# Patient Record
Sex: MALE | Race: WHITE | Employment: UNEMPLOYED | URBAN - METROPOLITAN AREA
[De-identification: names, ages, dates, MRNs, and addresses within clinical notes are randomized per-mention and may not be internally consistent; named-entity substitution may affect disease eponyms.]

---

## 2017-11-06 ENCOUNTER — HOSPITAL ENCOUNTER (OUTPATIENT)
Age: 22
Setting detail: SPECIMEN
Discharge: HOME | End: 2017-11-06
Payer: COMMERCIAL

## 2017-11-07 LAB — C. TRACHOMATIS DNA ,URINE: NEGATIVE

## 2018-10-21 ENCOUNTER — HOSPITAL ENCOUNTER (OUTPATIENT)
Age: 23
Setting detail: OBSERVATION
Discharge: HOME HEALTH | End: 2018-10-22
Attending: EMERGENCY MEDICINE | Admitting: PSYCHIATRY & NEUROLOGY
Payer: COMMERCIAL

## 2018-10-21 DIAGNOSIS — F32.A DEPRESSION WITH SUICIDAL IDEATION: Primary | ICD-10-CM

## 2018-10-21 DIAGNOSIS — R45.851 DEPRESSION WITH SUICIDAL IDEATION: Primary | ICD-10-CM

## 2018-10-21 DIAGNOSIS — R79.89 ELEVATED LIVER FUNCTION TESTS: ICD-10-CM

## 2018-10-21 DIAGNOSIS — F10.929 ACUTE ALCOHOLIC INTOXICATION WITH COMPLICATION (HCC): ICD-10-CM

## 2018-10-21 LAB
ABSOLUTE EOS #: 0 K/UL (ref 0–0.4)
ABSOLUTE IMMATURE GRANULOCYTE: ABNORMAL K/UL (ref 0–0.3)
ABSOLUTE LYMPH #: 2.6 K/UL (ref 1–4.8)
ABSOLUTE MONO #: 0.4 K/UL (ref 0.1–1.3)
ACETAMINOPHEN LEVEL: <5 UG/ML (ref 10–30)
ALBUMIN SERPL-MCNC: 5.1 G/DL (ref 3.5–5.2)
ALBUMIN/GLOBULIN RATIO: ABNORMAL (ref 1–2.5)
ALP BLD-CCNC: 50 U/L (ref 40–129)
ALT SERPL-CCNC: 67 U/L (ref 5–41)
AMPHETAMINE SCREEN URINE: NEGATIVE
ANION GAP SERPL CALCULATED.3IONS-SCNC: 16 MMOL/L (ref 9–17)
AST SERPL-CCNC: 43 U/L
BARBITURATE SCREEN URINE: NEGATIVE
BASOPHILS # BLD: 1 % (ref 0–2)
BASOPHILS ABSOLUTE: 0.1 K/UL (ref 0–0.2)
BENZODIAZEPINE SCREEN, URINE: NEGATIVE
BILIRUB SERPL-MCNC: 0.65 MG/DL (ref 0.3–1.2)
BUN BLDV-MCNC: 7 MG/DL (ref 6–20)
BUN/CREAT BLD: ABNORMAL (ref 9–20)
BUPRENORPHINE URINE: ABNORMAL
CALCIUM SERPL-MCNC: 9.4 MG/DL (ref 8.6–10.4)
CANNABINOID SCREEN URINE: POSITIVE
CHLORIDE BLD-SCNC: 100 MMOL/L (ref 98–107)
CO2: 23 MMOL/L (ref 20–31)
COCAINE METABOLITE, URINE: NEGATIVE
CREAT SERPL-MCNC: 1.01 MG/DL (ref 0.7–1.2)
DIFFERENTIAL TYPE: ABNORMAL
EOSINOPHILS RELATIVE PERCENT: 0 % (ref 0–4)
ETHANOL PERCENT: 0.33 %
ETHANOL: 329 MG/DL
GFR AFRICAN AMERICAN: >60 ML/MIN
GFR NON-AFRICAN AMERICAN: >60 ML/MIN
GFR SERPL CREATININE-BSD FRML MDRD: ABNORMAL ML/MIN/{1.73_M2}
GFR SERPL CREATININE-BSD FRML MDRD: ABNORMAL ML/MIN/{1.73_M2}
GLUCOSE BLD-MCNC: 181 MG/DL (ref 70–99)
HCT VFR BLD CALC: 50.8 % (ref 41–53)
HEMOGLOBIN: 17.3 G/DL (ref 13.5–17.5)
IMMATURE GRANULOCYTES: ABNORMAL %
LYMPHOCYTES # BLD: 28 % (ref 25–45)
MCH RBC QN AUTO: 32.5 PG (ref 26–34)
MCHC RBC AUTO-ENTMCNC: 34.1 G/DL (ref 31–37)
MCV RBC AUTO: 95.1 FL (ref 80–100)
MDMA URINE: ABNORMAL
METHADONE SCREEN, URINE: NEGATIVE
METHAMPHETAMINE, URINE: ABNORMAL
MONOCYTES # BLD: 4 % (ref 2–8)
NRBC AUTOMATED: ABNORMAL PER 100 WBC
OPIATES, URINE: NEGATIVE
OXYCODONE SCREEN URINE: NEGATIVE
PDW BLD-RTO: 13.2 % (ref 11.5–14.9)
PHENCYCLIDINE, URINE: NEGATIVE
PLATELET # BLD: 323 K/UL (ref 150–450)
PLATELET ESTIMATE: ABNORMAL
PMV BLD AUTO: 7 FL (ref 6–12)
POTASSIUM SERPL-SCNC: 4 MMOL/L (ref 3.7–5.3)
PROPOXYPHENE, URINE: ABNORMAL
RBC # BLD: 5.34 M/UL (ref 4.5–5.9)
RBC # BLD: ABNORMAL 10*6/UL
SALICYLATE LEVEL: <1 MG/DL (ref 3–10)
SEG NEUTROPHILS: 67 % (ref 34–64)
SEGMENTED NEUTROPHILS ABSOLUTE COUNT: 6.3 K/UL (ref 1.3–9.1)
SODIUM BLD-SCNC: 139 MMOL/L (ref 135–144)
TEST INFORMATION: ABNORMAL
THYROXINE, FREE: 1.32 NG/DL (ref 0.93–1.7)
TOTAL PROTEIN: 7.6 G/DL (ref 6.4–8.3)
TRICYCLIC ANTIDEP,URINE: NEGATIVE
TRICYCLIC ANTIDEPRESSANTS, UR: ABNORMAL
TSH SERPL DL<=0.05 MIU/L-ACNC: 1.27 MIU/L (ref 0.3–5)
WBC # BLD: 9.3 K/UL (ref 4.5–13.5)
WBC # BLD: ABNORMAL 10*3/UL

## 2018-10-21 PROCEDURE — G0378 HOSPITAL OBSERVATION PER HR: HCPCS

## 2018-10-21 PROCEDURE — 99285 EMERGENCY DEPT VISIT HI MDM: CPT

## 2018-10-21 PROCEDURE — 36415 COLL VENOUS BLD VENIPUNCTURE: CPT

## 2018-10-21 PROCEDURE — 80053 COMPREHEN METABOLIC PANEL: CPT

## 2018-10-21 PROCEDURE — 85025 COMPLETE CBC W/AUTO DIFF WBC: CPT

## 2018-10-21 PROCEDURE — 80307 DRUG TEST PRSMV CHEM ANLYZR: CPT

## 2018-10-21 PROCEDURE — 6370000000 HC RX 637 (ALT 250 FOR IP): Performed by: NURSE PRACTITIONER

## 2018-10-21 PROCEDURE — 84439 ASSAY OF FREE THYROXINE: CPT

## 2018-10-21 PROCEDURE — G0480 DRUG TEST DEF 1-7 CLASSES: HCPCS

## 2018-10-21 PROCEDURE — 84443 ASSAY THYROID STIM HORMONE: CPT

## 2018-10-21 PROCEDURE — 1240000000 HC EMOTIONAL WELLNESS R&B

## 2018-10-21 RX ORDER — HYDROXYZINE HYDROCHLORIDE 25 MG/1
50 TABLET, FILM COATED ORAL 3 TIMES DAILY PRN
Status: DISCONTINUED | OUTPATIENT
Start: 2018-10-21 | End: 2018-10-22 | Stop reason: HOSPADM

## 2018-10-21 RX ORDER — NICOTINE 21 MG/24HR
1 PATCH, TRANSDERMAL 24 HOURS TRANSDERMAL DAILY
Status: DISCONTINUED | OUTPATIENT
Start: 2018-10-21 | End: 2018-10-22 | Stop reason: HOSPADM

## 2018-10-21 RX ORDER — MAGNESIUM HYDROXIDE/ALUMINUM HYDROXICE/SIMETHICONE 120; 1200; 1200 MG/30ML; MG/30ML; MG/30ML
30 SUSPENSION ORAL EVERY 6 HOURS PRN
Status: DISCONTINUED | OUTPATIENT
Start: 2018-10-21 | End: 2018-10-22 | Stop reason: HOSPADM

## 2018-10-21 RX ORDER — TRAZODONE HYDROCHLORIDE 50 MG/1
50 TABLET ORAL NIGHTLY PRN
Status: DISCONTINUED | OUTPATIENT
Start: 2018-10-21 | End: 2018-10-22 | Stop reason: HOSPADM

## 2018-10-21 RX ORDER — BENZTROPINE MESYLATE 1 MG/ML
2 INJECTION INTRAMUSCULAR; INTRAVENOUS 2 TIMES DAILY PRN
Status: DISCONTINUED | OUTPATIENT
Start: 2018-10-21 | End: 2018-10-22 | Stop reason: HOSPADM

## 2018-10-21 RX ORDER — ACETAMINOPHEN 325 MG/1
650 TABLET ORAL EVERY 4 HOURS PRN
Status: DISCONTINUED | OUTPATIENT
Start: 2018-10-21 | End: 2018-10-22 | Stop reason: HOSPADM

## 2018-10-21 RX ADMIN — HYDROXYZINE HYDROCHLORIDE 50 MG: 25 TABLET, FILM COATED ORAL at 22:35

## 2018-10-21 RX ADMIN — HYDROXYZINE HYDROCHLORIDE 50 MG: 25 TABLET, FILM COATED ORAL at 17:56

## 2018-10-21 RX ADMIN — TRAZODONE HYDROCHLORIDE 50 MG: 50 TABLET ORAL at 22:35

## 2018-10-21 ASSESSMENT — SLEEP AND FATIGUE QUESTIONNAIRES
DO YOU HAVE DIFFICULTY SLEEPING: YES
DIFFICULTY FALLING ASLEEP: NO
DIFFICULTY STAYING ASLEEP: YES
DIFFICULTY ARISING: NO
DIFFICULTY ARISING: NO
DO YOU USE A SLEEP AID: NO
DIFFICULTY STAYING ASLEEP: YES
DO YOU USE A SLEEP AID: NO
RESTFUL SLEEP: NO
SLEEP PATTERN: INSOMNIA;RESTLESSNESS
DIFFICULTY FALLING ASLEEP: YES
DO YOU HAVE DIFFICULTY SLEEPING: YES
AVERAGE NUMBER OF SLEEP HOURS: 0
AVERAGE NUMBER OF SLEEP HOURS: 5
RESTFUL SLEEP: NO
SLEEP PATTERN: DISTURBED/INTERRUPTED SLEEP

## 2018-10-21 ASSESSMENT — PATIENT HEALTH QUESTIONNAIRE - PHQ9
SUM OF ALL RESPONSES TO PHQ QUESTIONS 1-9: 18
SUM OF ALL RESPONSES TO PHQ QUESTIONS 1-9: 4

## 2018-10-21 ASSESSMENT — ENCOUNTER SYMPTOMS
WHEEZING: 0
EYE PAIN: 0
SHORTNESS OF BREATH: 0
ABDOMINAL PAIN: 0
VOMITING: 0
NAUSEA: 0

## 2018-10-21 ASSESSMENT — LIFESTYLE VARIABLES
HISTORY_ALCOHOL_USE: YES
HISTORY_ALCOHOL_USE: YES
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
## 2018-10-21 NOTE — BH NOTE
Patient in behavioral control resting in lounge watching TV and being social.  Patient in LOS and safe guard present.

## 2018-10-21 NOTE — ED PROVIDER NOTES
RADIOLOGY:All plain film, CT, MRI, and formal ultrasound images (except ED bedside ultrasound) are read by the radiologist and the images and interpretations are directly viewed by the emergency physician. No orders to display         LABS: All lab results were reviewed by myself, and all abnormals are listed below. Labs Reviewed   CBC WITH AUTO DIFFERENTIAL - Abnormal; Notable for the following:        Result Value    Seg Neutrophils 67 (*)     All other components within normal limits   COMPREHENSIVE METABOLIC PANEL - Abnormal; Notable for the following:     Glucose 181 (*)     ALT 67 (*)     AST 43 (*)     All other components within normal limits   TOX SCR, BLD, ED - Abnormal; Notable for the following:     Ethanol 168 (*)     Salicylate Lvl <1 (*)     Acetaminophen Level <5 (*)     All other components within normal limits   URINE DRUG SCREEN - Abnormal; Notable for the following:     Cannabinoid Scrn, Ur POSITIVE (*)     All other components within normal limits   DRUG SCREEN TRICYCLIC URINE           Disposition   DISPOSITION:    DISPOSITION Admitted 10/21/2018 03:05:49 AM      CLINICAL IMPRESSION:  1. Depression with suicidal ideation    2. Acute alcoholic intoxication with complication ([facility] [address]    3. Elevated liver function tests          Darío Shepard MD  Attending Emergency Physician              Darío Shepard MD  10/21/18 6935      ED course updated.      Darío Shepard MD  10/21/18 8736

## 2018-10-22 VITALS
DIASTOLIC BLOOD PRESSURE: 107 MMHG | OXYGEN SATURATION: 100 % | TEMPERATURE: 97.3 F | SYSTOLIC BLOOD PRESSURE: 144 MMHG | HEIGHT: 69 IN | RESPIRATION RATE: 14 BRPM | BODY MASS INDEX: 22.51 KG/M2 | WEIGHT: 152 LBS | HEART RATE: 89 BPM

## 2018-10-22 PROBLEM — F06.4 ANXIETY DISORDER DUE TO KNOWN PHYSIOLOGICAL CONDITION: Chronic | Status: ACTIVE | Noted: 2018-10-21

## 2018-10-22 PROBLEM — F10.280 ALCOHOL DEPENDENCE WITH ALCOHOL-INDUCED ANXIETY DISORDER (HCC): Chronic | Status: ACTIVE | Noted: 2018-10-22

## 2018-10-22 LAB
CHOLESTEROL/HDL RATIO: 2
CHOLESTEROL: 189 MG/DL
HDLC SERPL-MCNC: 95 MG/DL
LDL CHOLESTEROL: 75 MG/DL (ref 0–130)
TRIGL SERPL-MCNC: 97 MG/DL
VLDLC SERPL CALC-MCNC: NORMAL MG/DL (ref 1–30)

## 2018-10-22 PROCEDURE — 80061 LIPID PANEL: CPT

## 2018-10-22 PROCEDURE — 90792 PSYCH DIAG EVAL W/MED SRVCS: CPT | Performed by: PSYCHIATRY & NEUROLOGY

## 2018-10-22 PROCEDURE — G0378 HOSPITAL OBSERVATION PER HR: HCPCS

## 2018-10-22 PROCEDURE — 36415 COLL VENOUS BLD VENIPUNCTURE: CPT

## 2018-10-22 PROCEDURE — 6370000000 HC RX 637 (ALT 250 FOR IP): Performed by: NURSE PRACTITIONER

## 2018-10-22 ASSESSMENT — ENCOUNTER SYMPTOMS
CONSTIPATION: 0
COUGH: 0
SPUTUM PRODUCTION: 0
VOMITING: 0
ABDOMINAL PAIN: 0
DIARRHEA: 0
PHOTOPHOBIA: 0
BLURRED VISION: 0
SHORTNESS OF BREATH: 0
DOUBLE VISION: 0
HEARTBURN: 0
WHEEZING: 0
NAUSEA: 0
BACK PAIN: 0
SINUS PAIN: 0
BLOOD IN STOOL: 0
SORE THROAT: 0

## 2018-10-22 ASSESSMENT — LIFESTYLE VARIABLES: HISTORY_ALCOHOL_USE: YES

## 2018-10-22 NOTE — CARE COORDINATION
Bridge Appointment completed: Reviewed Discharge Instructions with patient. Patient verbalizes understanding and agreement with the discharge plan using the teachback method. Discharge Arrangements: declined f/u.  Provided Rescue Crisis information     Guardian notified: NA   Discharge destination/address: address per face sheet   Transported by:    Self

## 2018-10-22 NOTE — PLAN OF CARE
Problem: Depressive Behavior With or Without Suicide Precautions:  Goal: Able to verbalize acceptance of life and situations over which he or she has no control  Able to verbalize acceptance of life and situations over which he or she has no control   Outcome: Ongoing  Pt. Refuses 10:00 AM psychotherapy, despite staff encouragement. 1:1 completed during assessment.

## 2018-10-22 NOTE — PLAN OF CARE
Problem: Depressive Behavior With or Without Suicide Precautions:  Goal: Able to verbalize acceptance of life and situations over which he or she has no control  Able to verbalize acceptance of life and situations over which he or she has no control   Outcome: Ongoing  [address] [place] [address]  Initial Interdisciplinary Treatment Plan NOTE    Original treatment plan Date & Time: 10/22/18 0804    Admission Type:  Admission Type: Involuntary    Reason for admission:   Reason for Admission: patient presents to the ER intoxicated stating he has thoughts of killing himself; no plan indicated.     Estimated Length of Stay:  5-7days  Estimated Discharge Date:  10/28/18 to be determined by physician    PATIENT STRENGTHS:  Patient Strengths:Strengths: Employment, No significant Physical Illness, Positive Support  Patient Strengths and Limitations:Limitations: Tendency to isolate self, Lacks leisure interests  Addictive Behavior: Addictive Behavior  In the past 3 months, have you felt or has someone told you that you have a problem with:  : None  Do you have a history of Chemical Use?: No  Do you have a history of Alcohol Use?: Yes  Do you have a history of Street Drug Abuse?: No  Histroy of Prescripton Drug Abuse?: No  Medical Problems:  Past Medical History:   Diagnosis Date    Anxiety     Bipolar 1 disorder ([facility] [address]      Status EXAM:Status and Exam  Normal: Yes  Facial Expression: Brightened  Affect: Appropriate  Level of Consciousness: Alert  Mood:Normal: Yes  Mood: Anxious  Motor Activity:Normal: Yes  Motor Activity: Increased  Interview Behavior: Cooperative  Preception: [place] to Person, Ozella Glance to Time, [place] to Place, [place] to Situation  Attention:Normal: Yes  Attention: Distractible  Thought Processes: Circumstantial  Thought Content:Normal: Yes  Hallucinations: None  Delusions: No  Memory:Normal: Yes  Insight and Judgment: No  Insight and Judgment: Poor Judgment  Present Suicidal Ideation: No  Present Homicidal

## 2018-10-22 NOTE — H&P
22.45 kg/m²  Body mass index is 22.45 kg/m². Pt was examined with a nurse present in the room. GENERAL APPEARANCE:  Josseline Mobley is 26 y.o.,  male, not obese, nourished, conscious, alert. Does not appear to be distress or pain at this time. SKIN:  Warm, dry, no cyanosis or jaundice. HEAD:  Normocephalic, atraumatic, no swelling or tenderness. EYES:  Pupils equal, reactive to light, Conjunctiva is clear, EOMs intact gianfranco. eyelids WNL. EARS:  No discharge, no marked hearing loss. NOSE:  No rhinorrhea, epistaxis or septal deformity. THROAT:  Not congested. No ulceration bleeding or discharge. NECK:  No stiffness, trachea central.       CHEST:  Symmetrical and equal on expansion. HEART:  Regular rate and rhythm. S1 > S2, No audible murmurs or gallops. LUNGS:  Equal on expansion, normal breath sounds. ABDOMEN:  Soft on palpation. No localized tenderness. No guarding or rigidity. No palpable organomegaly. LYMPHATICS:  No palpable Lymphadenopathy. LOCOMOTOR, BACK AND SPINE:  No tenderness or deformities. EXTREMITIES: Left posterior forearm with long vertical scar. There is area to medal left forearm, palpable boggy lesion. No open areas or s/s of infection. Symmetrical, no pedal edema. No calf tenderness. No discoloration or ulcerations. NEUROLOGIC:  The patient is conscious, alert, oriented, Gait and balance WNL. No apparent focal sensory deficits. No motor deficits, muscle strength equal Gianfranco. No facial droop, tongue protrudes centrally, no slurring of the speech. PROVISIONAL DIAGNOSES:      Principal Problem:    Anxiety disorder due to known physiological condition  Active Problems:    Alcohol dependence with alcohol-induced anxiety disorder ([facility] [address]  Resolved Problems:    * No resolved hospital problems.  Queen Radha, DARIA - CNP on 10/22/2018 at 1:05 PM

## 2019-01-16 ENCOUNTER — HOSPITAL ENCOUNTER (OUTPATIENT)
Dept: CT IMAGING | Age: 24
Discharge: HOME | End: 2019-01-18
Payer: COMMERCIAL

## 2019-01-16 DIAGNOSIS — R10.32 CHRONIC LEFT LOWER QUADRANT PAIN: ICD-10-CM

## 2019-01-16 DIAGNOSIS — G89.29 CHRONIC LEFT LOWER QUADRANT PAIN: ICD-10-CM

## 2019-01-16 PROCEDURE — 6360000004 HC RX CONTRAST MEDICATION: Performed by: FAMILY MEDICINE

## 2019-01-16 PROCEDURE — 2580000003 HC RX 258: Performed by: FAMILY MEDICINE

## 2019-01-16 PROCEDURE — 74177 CT ABD & PELVIS W/CONTRAST: CPT

## 2019-01-16 RX ORDER — SODIUM CHLORIDE 0.9 % (FLUSH) 0.9 %
10 SYRINGE (ML) INJECTION PRN
Status: DISCONTINUED | OUTPATIENT
Start: 2019-01-16 | End: 2019-01-19 | Stop reason: HOSPADM

## 2019-01-16 RX ORDER — 0.9 % SODIUM CHLORIDE 0.9 %
80 INTRAVENOUS SOLUTION INTRAVENOUS ONCE
Status: COMPLETED | OUTPATIENT
Start: 2019-01-16 | End: 2019-01-16

## 2019-01-16 RX ADMIN — IOHEXOL 50 ML: 240 INJECTION, SOLUTION INTRATHECAL; INTRAVASCULAR; INTRAVENOUS; ORAL at 15:59

## 2019-01-16 RX ADMIN — Medication 10 ML: at 15:59

## 2019-01-16 RX ADMIN — IOVERSOL 75 ML: 741 INJECTION INTRA-ARTERIAL; INTRAVENOUS at 15:59

## 2019-01-16 RX ADMIN — SODIUM CHLORIDE 80 ML: 9 INJECTION, SOLUTION INTRAVENOUS at 15:59

## 2019-02-21 ENCOUNTER — APPOINTMENT (OUTPATIENT)
Dept: GENERAL RADIOLOGY | Age: 24
End: 2019-02-21
Payer: COMMERCIAL

## 2019-02-21 ENCOUNTER — HOSPITAL ENCOUNTER (EMERGENCY)
Age: 24
Discharge: HOME | End: 2019-02-22
Attending: EMERGENCY MEDICINE
Payer: COMMERCIAL

## 2019-02-21 VITALS
HEART RATE: 111 BPM | OXYGEN SATURATION: 96 % | TEMPERATURE: 98.3 F | SYSTOLIC BLOOD PRESSURE: 141 MMHG | HEIGHT: 70 IN | WEIGHT: 170 LBS | DIASTOLIC BLOOD PRESSURE: 91 MMHG | BODY MASS INDEX: 24.34 KG/M2 | RESPIRATION RATE: 16 BRPM

## 2019-02-21 DIAGNOSIS — M79.641 RIGHT HAND PAIN: Primary | ICD-10-CM

## 2019-02-21 PROCEDURE — 73130 X-RAY EXAM OF HAND: CPT

## 2019-02-21 PROCEDURE — 99283 EMERGENCY DEPT VISIT LOW MDM: CPT

## 2019-02-21 ASSESSMENT — PAIN SCALES - GENERAL: PAINLEVEL_OUTOF10: 2

## 2019-02-21 ASSESSMENT — PAIN DESCRIPTION - ORIENTATION: ORIENTATION: RIGHT

## 2019-02-21 ASSESSMENT — PAIN DESCRIPTION - LOCATION: LOCATION: HAND

## 2019-02-21 ASSESSMENT — PAIN DESCRIPTION - DESCRIPTORS: DESCRIPTORS: ACHING

## 2019-02-22 ASSESSMENT — PAIN DESCRIPTION - ORIENTATION: ORIENTATION: RIGHT

## 2019-02-22 ASSESSMENT — PAIN DESCRIPTION - LOCATION: LOCATION: HAND

## 2019-02-22 ASSESSMENT — PAIN DESCRIPTION - DESCRIPTORS: DESCRIPTORS: ACHING

## 2019-02-22 ASSESSMENT — PAIN SCALES - GENERAL: PAINLEVEL_OUTOF10: 1

## 2019-03-11 ENCOUNTER — HOSPITAL ENCOUNTER (OUTPATIENT)
Age: 24
Discharge: HOME | End: 2019-03-13
Payer: COMMERCIAL

## 2019-03-11 ENCOUNTER — HOSPITAL ENCOUNTER (OUTPATIENT)
Dept: GENERAL RADIOLOGY | Age: 24
Discharge: HOME | End: 2019-03-13
Payer: COMMERCIAL

## 2019-03-11 DIAGNOSIS — R52 PAIN: ICD-10-CM

## 2019-03-11 PROCEDURE — 73080 X-RAY EXAM OF ELBOW: CPT

## 2023-05-03 ENCOUNTER — OFFICE VISIT (OUTPATIENT)
Dept: GASTROENTEROLOGY | Age: 28
End: 2023-05-03
Payer: MEDICARE

## 2023-05-03 VITALS
BODY MASS INDEX: 27.06 KG/M2 | HEIGHT: 70 IN | DIASTOLIC BLOOD PRESSURE: 89 MMHG | SYSTOLIC BLOOD PRESSURE: 131 MMHG | WEIGHT: 189 LBS

## 2023-05-03 DIAGNOSIS — R13.19 ESOPHAGEAL DYSPHAGIA: ICD-10-CM

## 2023-05-03 DIAGNOSIS — R79.89 ABNORMAL LFTS: Primary | ICD-10-CM

## 2023-05-03 DIAGNOSIS — K72.00 ACUTE AND SUBACUTE HEPATIC FAILURE WITHOUT COMA: ICD-10-CM

## 2023-05-03 DIAGNOSIS — R11.0 CHRONIC NAUSEA: ICD-10-CM

## 2023-05-03 PROCEDURE — 99204 OFFICE O/P NEW MOD 45 MIN: CPT | Performed by: INTERNAL MEDICINE

## 2023-05-03 RX ORDER — PROPRANOLOL HYDROCHLORIDE 80 MG/1
TABLET ORAL
COMMUNITY
Start: 2023-04-15

## 2023-05-03 ASSESSMENT — ENCOUNTER SYMPTOMS
NAUSEA: 1
ALLERGIC/IMMUNOLOGIC NEGATIVE: 1
EYES NEGATIVE: 1
TROUBLE SWALLOWING: 1
CONSTIPATION: 1
VOMITING: 1
ABDOMINAL PAIN: 1
ABDOMINAL DISTENTION: 1
RESPIRATORY NEGATIVE: 1

## 2023-05-03 NOTE — PROGRESS NOTES
Ht 5' 10\" (1.778 m)   Wt 189 lb (85.7 kg)   BMI 27.12 kg/m²   Body mass index is 27.12 kg/m². Physical Exam      LABORATORY DATA: Reviewed  Lab Results   Component Value Date    WBC 9.3 10/21/2018    HGB 17.3 10/21/2018    HCT 50.8 10/21/2018    MCV 95.1 10/21/2018     10/21/2018     10/21/2018    K 4.0 10/21/2018     10/21/2018    CO2 23 10/21/2018    BUN 7 10/21/2018    CREATININE 1.01 10/21/2018    LABALBU 5.1 10/21/2018    BILITOT 0.65 10/21/2018    ALKPHOS 50 10/21/2018    AST 43 (H) 10/21/2018    ALT 67 (H) 10/21/2018         Lab Results   Component Value Date    RBC 5.34 10/21/2018    HGB 17.3 10/21/2018    MCV 95.1 10/21/2018    MCH 32.5 10/21/2018    MCHC 34.1 10/21/2018    RDW 13.2 10/21/2018    MPV 7.0 10/21/2018    BASOPCT 1 10/21/2018    LYMPHSABS 2.60 10/21/2018    MONOSABS 0.40 10/21/2018    NEUTROABS 6.30 10/21/2018    EOSABS 0.00 10/21/2018    BASOSABS 0.10 10/21/2018         DIAGNOSTIC TESTING:     No results found. IMPRESSION: Mr. Anne-Marie Bowens is a 34 y.o. male with     Assessment:  1. Abnormal LFTs    2. Acute and subacute hepatic failure without coma    3. Esophageal dysphagia    4. Chronic nausea        Plan: At present patient appears stable. He appears to have chronic liver disease. Need to evaluate metabolic liver diseases as well. Most likely etiology of the liver disease may be alcoholism. I did discuss with the patient regarding this and advised to cut down alcohol and stop. Discussed regarding long-term issues, complications. He has a persistent nausea. Need to evaluate upper GI issues including esophagitis gastritis ulcer disease etc.  He may need EGD this is arranged. Also advised to have labs regarding chronic liver disease  Spent 30 minutes providing patient education and counseling. Thank you for allowing me to participate in the care of Mr. Anne-Marie Ovens. For any further questions please do not hesitate to contact me.     Note is dictated

## 2023-05-09 ENCOUNTER — TELEPHONE (OUTPATIENT)
Dept: GASTROENTEROLOGY | Age: 28
End: 2023-05-09

## 2023-05-17 ENCOUNTER — HOSPITAL ENCOUNTER (OUTPATIENT)
Dept: PREADMISSION TESTING | Age: 28
Discharge: HOME | End: 2023-05-21

## 2023-05-17 VITALS — WEIGHT: 180 LBS | HEIGHT: 70 IN | BODY MASS INDEX: 25.77 KG/M2

## 2023-05-17 RX ORDER — ALBUTEROL SULFATE 90 UG/1
2 AEROSOL, METERED RESPIRATORY (INHALATION) EVERY 6 HOURS PRN
COMMUNITY

## 2023-05-24 LAB
ALBUMIN SERPL-MCNC: 5.4 G/DL (ref 3.5–5.2)
ALK PHOSPHATASE: 52 U/L (ref 40–115)
ALT SERPL-CCNC: 343 U/L (ref 5–50)
AST SERPL-CCNC: 239 U/L (ref 9–50)
AVERAGE GLUCOSE: 103 MG/DL
BILIRUB SERPL-MCNC: 1.3 MG/DL
BILIRUBIN DIRECT: 0.4 MG/DL (ref 0–0.3)
FERRITIN: 598 NG/ML (ref 30–400)
HAV IGM SER IA-ACNC: NORMAL
HBA1C MFR BLD: 5.2 % (ref 4.2–5.6)
HEPATITIS B CORE IGM ANTIBODY: NORMAL
HEPATITIS B SURF AG,XHBAGS: NORMAL
HEPATITIS C ANTIBODY: NORMAL
IRON: 100 UG/DL (ref 59–158)
TOTAL PROTEIN: 7.1 G/DL (ref 6.1–8.3)

## 2023-05-25 LAB
ANA PATTERN: NORMAL
ANTI-NUCLEAR ANTIBODY (ANA): NEGATIVE
CENTROMERE PATTERN: NEGATIVE TITER
CERULOPLASMIN: 19.7 MG/DL (ref 15–30)
COARSE SPECKLED PATTERN: NEGATIVE TITER
COMMENTS: NORMAL
CYTO RETICULAR (MITO) PATTERN: NEGATIVE
DENSE FINE SPECKLED PATTERN: NEGATIVE TITER
DISCRETE NUCLEAR DOTS PATTERN: NEGATIVE TITER
F-ACTIN AB IGG: 6.9 UNITS
HOMOGENEOUS PATTERN: NEGATIVE TITER
METHOD: NORMAL
MITOCHONDRIAL M2 AB, IGG: 2.2 UNITS
NUCLEAR MEMBRANE PATTERN: NEGATIVE TITER
NUCLEOLAR PATTERN: NEGATIVE TITER
SMOOTH MUSCLE AB IGG TITER: NORMAL TITER
SPECKLED PATTERN: NEGATIVE TITER
TRANSFERRIN: 258 MG/DL (ref 200–360)

## 2023-05-27 LAB
ALPHA-1 ANTITRYPSIN PHENOTYPE: NORMAL
ALPHA-1 ANTITRYPSIN: 152 MG/DL (ref 90–200)

## 2023-05-30 ENCOUNTER — ANESTHESIA EVENT (OUTPATIENT)
Dept: ENDOSCOPY | Age: 28
End: 2023-05-30
Payer: COMMERCIAL

## 2023-05-31 ENCOUNTER — ANESTHESIA (OUTPATIENT)
Dept: ENDOSCOPY | Age: 28
End: 2023-05-31
Payer: COMMERCIAL

## 2023-05-31 ENCOUNTER — HOSPITAL ENCOUNTER (OUTPATIENT)
Age: 28
Setting detail: OUTPATIENT SURGERY
Discharge: HOME | End: 2023-05-31
Attending: INTERNAL MEDICINE | Admitting: INTERNAL MEDICINE
Payer: COMMERCIAL

## 2023-05-31 VITALS
WEIGHT: 180 LBS | HEIGHT: 70 IN | OXYGEN SATURATION: 100 % | SYSTOLIC BLOOD PRESSURE: 121 MMHG | RESPIRATION RATE: 16 BRPM | HEART RATE: 75 BPM | TEMPERATURE: 97.5 F | BODY MASS INDEX: 25.77 KG/M2 | DIASTOLIC BLOOD PRESSURE: 94 MMHG

## 2023-05-31 DIAGNOSIS — R13.19 ESOPHAGEAL DYSPHAGIA: ICD-10-CM

## 2023-05-31 PROCEDURE — 2580000003 HC RX 258: Performed by: ANESTHESIOLOGY

## 2023-05-31 PROCEDURE — 3609012400 HC EGD TRANSORAL BIOPSY SINGLE/MULTIPLE: Performed by: INTERNAL MEDICINE

## 2023-05-31 PROCEDURE — 3700000000 HC ANESTHESIA ATTENDED CARE: Performed by: INTERNAL MEDICINE

## 2023-05-31 PROCEDURE — 88312 SPECIAL STAINS GROUP 1: CPT

## 2023-05-31 PROCEDURE — 88305 TISSUE EXAM BY PATHOLOGIST: CPT

## 2023-05-31 PROCEDURE — 7100000011 HC PHASE II RECOVERY - ADDTL 15 MIN: Performed by: INTERNAL MEDICINE

## 2023-05-31 PROCEDURE — 6360000002 HC RX W HCPCS: Performed by: NURSE ANESTHETIST, CERTIFIED REGISTERED

## 2023-05-31 PROCEDURE — 2500000003 HC RX 250 WO HCPCS: Performed by: NURSE ANESTHETIST, CERTIFIED REGISTERED

## 2023-05-31 PROCEDURE — 2709999900 HC NON-CHARGEABLE SUPPLY: Performed by: INTERNAL MEDICINE

## 2023-05-31 PROCEDURE — 7100000010 HC PHASE II RECOVERY - FIRST 15 MIN: Performed by: INTERNAL MEDICINE

## 2023-05-31 RX ORDER — SODIUM CHLORIDE 0.9 % (FLUSH) 0.9 %
5-40 SYRINGE (ML) INJECTION PRN
Status: DISCONTINUED | OUTPATIENT
Start: 2023-05-31 | End: 2023-05-31 | Stop reason: HOSPADM

## 2023-05-31 RX ORDER — LIDOCAINE HYDROCHLORIDE 20 MG/ML
INJECTION, SOLUTION EPIDURAL; INFILTRATION; INTRACAUDAL; PERINEURAL PRN
Status: DISCONTINUED | OUTPATIENT
Start: 2023-05-31 | End: 2023-05-31 | Stop reason: SDUPTHER

## 2023-05-31 RX ORDER — SODIUM CHLORIDE 9 MG/ML
INJECTION, SOLUTION INTRAVENOUS PRN
Status: DISCONTINUED | OUTPATIENT
Start: 2023-05-31 | End: 2023-05-31 | Stop reason: HOSPADM

## 2023-05-31 RX ORDER — PROPOFOL 10 MG/ML
INJECTION, EMULSION INTRAVENOUS PRN
Status: DISCONTINUED | OUTPATIENT
Start: 2023-05-31 | End: 2023-05-31 | Stop reason: SDUPTHER

## 2023-05-31 RX ORDER — SODIUM CHLORIDE 0.9 % (FLUSH) 0.9 %
5-40 SYRINGE (ML) INJECTION EVERY 12 HOURS SCHEDULED
Status: DISCONTINUED | OUTPATIENT
Start: 2023-05-31 | End: 2023-05-31 | Stop reason: HOSPADM

## 2023-05-31 RX ORDER — ONDANSETRON 2 MG/ML
4 INJECTION INTRAMUSCULAR; INTRAVENOUS
Status: DISCONTINUED | OUTPATIENT
Start: 2023-05-31 | End: 2023-05-31 | Stop reason: HOSPADM

## 2023-05-31 RX ORDER — FENTANYL CITRATE 0.05 MG/ML
50 INJECTION, SOLUTION INTRAMUSCULAR; INTRAVENOUS EVERY 5 MIN PRN
Status: DISCONTINUED | OUTPATIENT
Start: 2023-05-31 | End: 2023-05-31 | Stop reason: HOSPADM

## 2023-05-31 RX ORDER — FENTANYL CITRATE 0.05 MG/ML
25 INJECTION, SOLUTION INTRAMUSCULAR; INTRAVENOUS EVERY 5 MIN PRN
Status: DISCONTINUED | OUTPATIENT
Start: 2023-05-31 | End: 2023-05-31 | Stop reason: HOSPADM

## 2023-05-31 RX ORDER — DIPHENHYDRAMINE HYDROCHLORIDE 50 MG/ML
12.5 INJECTION INTRAMUSCULAR; INTRAVENOUS
Status: DISCONTINUED | OUTPATIENT
Start: 2023-05-31 | End: 2023-05-31 | Stop reason: HOSPADM

## 2023-05-31 RX ORDER — SODIUM CHLORIDE, SODIUM LACTATE, POTASSIUM CHLORIDE, CALCIUM CHLORIDE 600; 310; 30; 20 MG/100ML; MG/100ML; MG/100ML; MG/100ML
INJECTION, SOLUTION INTRAVENOUS CONTINUOUS
Status: DISCONTINUED | OUTPATIENT
Start: 2023-05-31 | End: 2023-05-31 | Stop reason: HOSPADM

## 2023-05-31 RX ORDER — LIDOCAINE HYDROCHLORIDE 10 MG/ML
1 INJECTION, SOLUTION EPIDURAL; INFILTRATION; INTRACAUDAL; PERINEURAL
Status: DISCONTINUED | OUTPATIENT
Start: 2023-05-31 | End: 2023-05-31 | Stop reason: HOSPADM

## 2023-05-31 RX ADMIN — SODIUM CHLORIDE, POTASSIUM CHLORIDE, SODIUM LACTATE AND CALCIUM CHLORIDE: 600; 310; 30; 20 INJECTION, SOLUTION INTRAVENOUS at 11:24

## 2023-05-31 RX ADMIN — LIDOCAINE HYDROCHLORIDE 120 MG: 20 INJECTION, SOLUTION EPIDURAL; INFILTRATION; INTRACAUDAL; PERINEURAL at 12:58

## 2023-05-31 RX ADMIN — PROPOFOL 300 MG: 10 INJECTION, EMULSION INTRAVENOUS at 12:58

## 2023-05-31 ASSESSMENT — PAIN - FUNCTIONAL ASSESSMENT
PAIN_FUNCTIONAL_ASSESSMENT: ACTIVITIES ARE NOT PREVENTED
PAIN_FUNCTIONAL_ASSESSMENT: 0-10

## 2023-05-31 ASSESSMENT — PAIN DESCRIPTION - DESCRIPTORS: DESCRIPTORS: DISCOMFORT

## 2023-05-31 ASSESSMENT — LIFESTYLE VARIABLES: SMOKING_STATUS: 1

## 2023-05-31 NOTE — ANESTHESIA POSTPROCEDURE EVALUATION
Department of Anesthesiology  Postprocedure Note    Patient: Christie Mike  MRN: [id number]  YOB: 1995  Date of evaluation: 5/31/2023      Procedure Summary     Date: 05/31/23 [facility] / [facility] [place] [address] [facility]    Anesthesia Start: 1253 Anesthesia Stop: 7463    Procedure: EGD BIOPSY (Esophagus) Diagnosis:       Esophageal dysphagia      (Esophageal dysphagia [R13.19])    Surgeons: Reed Nevarez MD Responsible Provider: Phillip Alcantara MD    Anesthesia Type: General ASA Status: 2          Anesthesia Type: General    Itzel Phase I:      Itzel Phase II: Itzel Score: 9      Anesthesia Post Evaluation    Comments: POST- ANESTHESIA EVALUATION       Pt Name: Christie Mike  MRN: [id number]  YOB: 1995  Date of evaluation: 5/31/2023  Time:  2:00 PM      BP (!) 121/94   Pulse 75   Temp 97.5 °F (36.4 °C)   Resp 16   Ht 5' 10\" (1.778 m)   Wt 180 lb (81.6 kg)   SpO2 100%   BMI 25.83 kg/m²      Consciousness Level  Awake  Cardiopulmonary Status  Stable  Pain Adequately Treated YES  Nausea / Vomiting  NO  Adequate Hydration  YES  Anesthesia Related Complications NONE      Electronically signed by Phillip Alcantara MD on 5/31/2023 at 2:00 PM

## 2023-05-31 NOTE — H&P
HISTORY and [id number]       NAME:  Neftali Hills  MRN: [id number]   YOB: 1995   Date: 5/31/2023   Age: 34 y.o. Gender: male       COMPLAINT AND PRESENT HISTORY:           Neftali Hills is 34 y.o.,  male, undergoing for EGD BIOPSY. ESOPHAGOGASTRODUODENOSCOPY. Pt is being seen for hx of:  Pre-Op Diagnosis Codes:     * Esophageal dysphagia with onset of over a year, that is progressively getting worse. Pt states that he is usually nauseous in morning. Pt states that thicker foods and meat. Pt reports that he is having more difficulty with swallowing , more tightness. He states that he is chasing his food with water. He reports chewing food more, but still has resistance with swallowing. Patient has not had previous endoscopies done before , this is his first.         Patient denies any heartburn, indigestion, acid reflux (GERD) . . pt takes an OTC antiacid. Pt reports some regurgitation. Pt admits to occasional  epigastric pain with associated. nausea or  vomiting. Pt states that his appetite fluctuates. Wt loss. No Diarrhea . Pt admits to more constipation. No blood in stools, or tarry stools. No FH of esophageal cancer. Hx of any significant  medical hx : Asthma , HTN, Vapes    Blood thinner/anticoagulant: no     Patient voices feeling well today. Denies any recent fever or chills, chest pain or SOB. Pt denies any hx of blood clots    Functional Capacity per patient:              1. Patient is not able to walk 2 city blocks on level ground without SOB. 2. Patient is not able to climb 2 flights of stairs without SOB. Hx of smoking : vapes    Pt denies any issues with Anesthesia. Patient has been NPO since midnight. No blood thinners in the past 5-7 days.  Pt took his propanolol and inhaler this am.       PAST MEDICAL HISTORY     Past Medical History:   Diagnosis Date    Anxiety     Arthritis     Asthma

## 2023-05-31 NOTE — DISCHARGE INSTRUCTIONS
appointments as directed by your caregiver. SEEK IMMEDIATE MEDICAL CARE IF:   You are not feeling normal or behaving normally after 24 hours. You have persistent nausea and vomiting. You are unable to drink fluids or eat food. You have difficulty urinating. You have difficulty breathing or speaking. You have blue or gray skin. There is difficulty waking or you cannot be woken up. You have heavy bleeding, redness, or a lot of swelling where the sedative or anesthesia entered your skin (intravenous site). You have a rash. MAKE SURE YOU:  Understand these instructions. Will watch your condition. Will get help right away if you are not doing well or get worse. Document Released: 12/18/2006 Document Revised: 06/18/2013 Document Reviewed: 04/17/2013  DIONICIO HINTON [facility] Patient Information ©2013 Rafael. Learning About Gastric Polyps  What are gastric polyps? Gastric polyps are growths in the stomach. Most people who get them don't have any problems. The cause of most gastric polyps is not known. But some polyps grow in people who use stomach acid reducers called proton pump inhibitors (PPIs). People who have gastritis, ulcers, or an H. pylori infection in the stomach can sometimes get polyps. People who have a parent, brother, or sister with gastric polyps might have them too. Most gastric polyps aren't cancer. But a certain kind of polyp can turn into cancer. What are the symptoms? You may not know that you have gastric polyps. Most polyps don't lead to symptoms. Once in a while, larger polyps may cause bleeding, belly pain, or a blockage in the stomach. How are polyps diagnosed and treated? Most gastric polyps are found during an endoscopy (say \"[id number]-pee\") that is done for another health problem. Endoscopy is a test that uses a thin flexible tube to allow a doctor to look at the inside of your stomach.   Your doctor will treat your polyps based on what he or she sees during this test.

## 2023-05-31 NOTE — OP NOTE
ESOPHAGOGASTRODUODENOSCOPY   ( EGD )  DATE OF PROCEDURE: 5/31/2023     SURGEON: Jv Treviño MD    ASSISTANT: None    PREOPERATIVE DIAGNOSIS: Patient has dyspepsia and GERD. Has intermittent swallowing issues. Procedure performed to evaluate upper GI lesions    POSTOPERATIVE DIAGNOSIS: No lesions seen that can account for patient's symptoms no varices. Small questionable polyp-like lesion at the GE junction, excised with biopsy forceps    OPERATION: Upper GI endoscopy with Biopsy    ANESTHESIA: MAC    ESTIMATED BLOOD LOSS: None    COMPLICATIONS: None. SPECIMENS:  Was Obtained: Small polyp at the GE junction excised with biopsy forceps rule out dysplasia    HISTORY: The patient is a 34y.o. year old male with history of above preop diagnosis. I recommended esophagogastroduodenoscopy with possible biopsy and I explained the risk, benefits, expected outcome, and alternatives to the procedure. Risks included but are not limited to bleeding, infection, respiratory distress, hypotension, and perforation of the esophagus, stomach, or duodenum. Patient understands and is in agreement. PROCEDURE: The patient was given IV conscious sedation. The patient's SPO2 remained above 90% throughout the procedure. Cetacaine spray given. Patient placed in left lateral position. Olympus  videogastroscope was inserted orally under vision into the esophagus without difficulty and advanced into the stomach then through the pylorus up to the second part of duodenum. Findings:    Retropharyngeal area was grossly normal appearing    Esophagus: normal.  No stricture seen. No signs of eosinophilic esophagitis. GE junction is at about 38 cm. In this area there is a questionable small polyp 3 to 4 mm seen, excised with biopsy forceps. No hiatal hernia. No signs of Price's mucosa or peptic esophagitis seen.     Stomach:    Fundus and Cardia Examined in Retroflexed View: normal    Body: normal    Antrum:
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
## 2023-06-02 LAB — SURGICAL PATHOLOGY REPORT: NORMAL

## 2023-06-23 ENCOUNTER — OFFICE VISIT (OUTPATIENT)
Dept: GASTROENTEROLOGY | Age: 28
End: 2023-06-23
Payer: COMMERCIAL

## 2023-06-23 VITALS
BODY MASS INDEX: 26.28 KG/M2 | DIASTOLIC BLOOD PRESSURE: 87 MMHG | SYSTOLIC BLOOD PRESSURE: 139 MMHG | HEIGHT: 70 IN | HEART RATE: 73 BPM | OXYGEN SATURATION: 96 % | WEIGHT: 183.6 LBS

## 2023-06-23 DIAGNOSIS — K72.00 ACUTE AND SUBACUTE HEPATIC FAILURE WITHOUT COMA: ICD-10-CM

## 2023-06-23 DIAGNOSIS — R11.0 CHRONIC NAUSEA: Primary | ICD-10-CM

## 2023-06-23 DIAGNOSIS — R10.9 ABDOMINAL PAIN, UNSPECIFIED ABDOMINAL LOCATION: ICD-10-CM

## 2023-06-23 DIAGNOSIS — R12 HEARTBURN: ICD-10-CM

## 2023-06-23 DIAGNOSIS — K22.10 EROSIVE ESOPHAGITIS: ICD-10-CM

## 2023-06-23 PROCEDURE — 1036F TOBACCO NON-USER: CPT | Performed by: INTERNAL MEDICINE

## 2023-06-23 PROCEDURE — 99214 OFFICE O/P EST MOD 30 MIN: CPT | Performed by: INTERNAL MEDICINE

## 2023-06-23 PROCEDURE — G8427 DOCREV CUR MEDS BY ELIG CLIN: HCPCS | Performed by: INTERNAL MEDICINE

## 2023-06-23 PROCEDURE — G8419 CALC BMI OUT NRM PARAM NOF/U: HCPCS | Performed by: INTERNAL MEDICINE

## 2023-06-23 PROCEDURE — APPSS45 APP SPLIT SHARED TIME 31-45 MINUTES: Performed by: NURSE PRACTITIONER

## 2023-06-23 RX ORDER — HYDROXYZINE PAMOATE 25 MG/1
CAPSULE ORAL
COMMUNITY
Start: 2023-06-12

## 2023-06-23 RX ORDER — ONDANSETRON 4 MG/1
4 TABLET, ORALLY DISINTEGRATING ORAL EVERY 8 HOURS PRN
COMMUNITY
Start: 2023-04-22

## 2023-06-23 RX ORDER — CYCLOBENZAPRINE HCL 10 MG
10 TABLET ORAL DAILY PRN
COMMUNITY
Start: 2023-06-12

## 2023-06-23 RX ORDER — PANTOPRAZOLE SODIUM 40 MG/1
40 TABLET, DELAYED RELEASE ORAL
Qty: 30 TABLET | Refills: 5 | Status: SHIPPED | OUTPATIENT
Start: 2023-06-23

## 2023-06-23 RX ORDER — CLONIDINE 0.1 MG/24H
PATCH, EXTENDED RELEASE TRANSDERMAL
COMMUNITY
Start: 2021-06-15

## 2023-06-23 ASSESSMENT — ENCOUNTER SYMPTOMS
ABDOMINAL PAIN: 0
RECTAL PAIN: 0
DIARRHEA: 0
CONSTIPATION: 0
BLOOD IN STOOL: 0
NAUSEA: 1
ABDOMINAL DISTENTION: 0
TROUBLE SWALLOWING: 0
VOMITING: 0
ANAL BLEEDING: 0
SORE THROAT: 0
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
## 2023-06-27 LAB
ALBUMIN SERPL-MCNC: 5.4 G/DL (ref 3.5–5.2)
ALK PHOSPHATASE: 57 U/L (ref 40–115)
ALT SERPL-CCNC: 424 U/L (ref 5–50)
AST SERPL-CCNC: 315 U/L (ref 9–50)
BILIRUB SERPL-MCNC: 1.1 MG/DL
BILIRUBIN DIRECT: 0.3 MG/DL (ref 0–0.3)
TOTAL PROTEIN: 7.2 G/DL (ref 6.1–8.3)

## 2023-06-28 LAB
GLIADIN ANTIBODIES IGA: <1 U/ML
GLIADIN ANTIBODIES IGG: <1 U/ML
TISSUE TRANSGLUTAMINASE IGA: <1 U/ML
TTG, IGG: <1 U/ML

## 2023-07-02 LAB — PARIETAL CELL AB: 1.3 UNITS

## 2023-08-30 ENCOUNTER — OFFICE VISIT (OUTPATIENT)
Dept: GASTROENTEROLOGY | Age: 28
End: 2023-08-30
Payer: COMMERCIAL

## 2023-08-30 VITALS
TEMPERATURE: 97.2 F | HEART RATE: 107 BPM | WEIGHT: 182.4 LBS | SYSTOLIC BLOOD PRESSURE: 143 MMHG | HEIGHT: 70 IN | DIASTOLIC BLOOD PRESSURE: 98 MMHG | BODY MASS INDEX: 26.11 KG/M2

## 2023-08-30 DIAGNOSIS — R79.89 ABNORMAL LFTS: ICD-10-CM

## 2023-08-30 DIAGNOSIS — R10.9 ABDOMINAL PAIN, UNSPECIFIED ABDOMINAL LOCATION: Primary | ICD-10-CM

## 2023-08-30 DIAGNOSIS — F10.280 ALCOHOL DEPENDENCE WITH ALCOHOL-INDUCED ANXIETY DISORDER (HCC): Chronic | ICD-10-CM

## 2023-08-30 PROCEDURE — 1036F TOBACCO NON-USER: CPT | Performed by: NURSE PRACTITIONER

## 2023-08-30 PROCEDURE — G8419 CALC BMI OUT NRM PARAM NOF/U: HCPCS | Performed by: NURSE PRACTITIONER

## 2023-08-30 PROCEDURE — G8427 DOCREV CUR MEDS BY ELIG CLIN: HCPCS | Performed by: NURSE PRACTITIONER

## 2023-08-30 PROCEDURE — 99214 OFFICE O/P EST MOD 30 MIN: CPT | Performed by: NURSE PRACTITIONER

## 2023-08-30 RX ORDER — ARIPIPRAZOLE 5 MG/1
5 TABLET ORAL DAILY
COMMUNITY
Start: 2023-08-16

## 2023-08-30 NOTE — PROGRESS NOTES
with repeat LFTs. Thank you for allowing me to participate in the care of Mr. Kirsty Clark. For any further questions please do not hesitate to contact me. I have reviewed and agree with the ROS entered by the MA/LLOYD.          JOANIE Borges    [facility]  Office #: [phone]

## 2023-09-22 LAB
ALBUMIN SERPL-MCNC: 5.5 G/DL
ALP BLD-CCNC: 52 U/L
ALT SERPL-CCNC: 265 U/L
ANION GAP SERPL CALCULATED.3IONS-SCNC: 12 MMOL/L
AST SERPL-CCNC: 167 U/L
BILIRUB SERPL-MCNC: 1.5 MG/DL (ref 0.1–1.4)
BUN BLDV-MCNC: 7 MG/DL
CALCIUM SERPL-MCNC: 10.8 MG/DL
CHLORIDE BLD-SCNC: 97 MMOL/L
CO2: 26 MMOL/L
CREAT SERPL-MCNC: 0.86 MG/DL
EGFR: 122
GLUCOSE BLD-MCNC: 101 MG/DL
POTASSIUM SERPL-SCNC: 4.8 MMOL/L
SODIUM BLD-SCNC: 135 MMOL/L
TOTAL PROTEIN: 7.3

## 2023-10-05 DIAGNOSIS — R79.89 ABNORMAL LFTS: ICD-10-CM

## 2023-10-05 DIAGNOSIS — R10.9 ABDOMINAL PAIN, UNSPECIFIED ABDOMINAL LOCATION: ICD-10-CM

## (undated) DEVICE — DEFENDO AIR WATER SUCTION AND BIOPSY VALVE KIT FOR  OLYMPUS: Brand: DEFENDO AIR/WATER/SUCTION AND BIOPSY VALVE

## (undated) DEVICE — BITEBLOCK 54FR W/ DENT RIM BLOX

## (undated) DEVICE — ENDO KIT W/SYRINGE: Brand: MEDLINE INDUSTRIES, INC.

## (undated) DEVICE — GLOVE ORANGE PI 7   MSG9070

## (undated) DEVICE — FORCEPS BX L240CM WRK CHN 2.8MM STD CAP W/ NDL MIC MESH